# Patient Record
Sex: FEMALE | Race: WHITE | URBAN - METROPOLITAN AREA
[De-identification: names, ages, dates, MRNs, and addresses within clinical notes are randomized per-mention and may not be internally consistent; named-entity substitution may affect disease eponyms.]

---

## 2020-06-10 ENCOUNTER — IMPORTED ENCOUNTER (OUTPATIENT)
Dept: URBAN - METROPOLITAN AREA CLINIC 50 | Facility: CLINIC | Age: 70
End: 2020-06-10

## 2020-06-16 ENCOUNTER — HOSPITAL ENCOUNTER (OUTPATIENT)
Dept: URGENT CARE | Facility: CLINIC | Age: 70
Discharge: HOME | End: 2020-06-16
Attending: FAMILY MEDICINE

## 2020-07-07 ENCOUNTER — IMPORTED ENCOUNTER (OUTPATIENT)
Dept: URBAN - METROPOLITAN AREA CLINIC 50 | Facility: CLINIC | Age: 70
End: 2020-07-07

## 2020-07-22 ENCOUNTER — IMPORTED ENCOUNTER (OUTPATIENT)
Dept: URBAN - METROPOLITAN AREA CLINIC 50 | Facility: CLINIC | Age: 70
End: 2020-07-22

## 2020-07-23 ENCOUNTER — IMPORTED ENCOUNTER (OUTPATIENT)
Dept: URBAN - METROPOLITAN AREA CLINIC 50 | Facility: CLINIC | Age: 70
End: 2020-07-23

## 2020-09-02 ENCOUNTER — IMPORTED ENCOUNTER (OUTPATIENT)
Dept: URBAN - METROPOLITAN AREA CLINIC 50 | Facility: CLINIC | Age: 70
End: 2020-09-02

## 2021-03-02 ENCOUNTER — IMPORTED ENCOUNTER (OUTPATIENT)
Dept: URBAN - METROPOLITAN AREA CLINIC 50 | Facility: CLINIC | Age: 71
End: 2021-03-02

## 2021-04-17 ASSESSMENT — TONOMETRY
OS_IOP_MMHG: 11
OS_IOP_MMHG: 11
OS_IOP_MMHG: 15
OS_IOP_MMHG: 15
OD_IOP_MMHG: 14
OD_IOP_MMHG: 15
OD_IOP_MMHG: 13
OS_IOP_MMHG: 15

## 2021-04-17 ASSESSMENT — PACHYMETRY
OS_CT_UM: 457

## 2021-04-17 ASSESSMENT — VISUAL ACUITY
OD_CC: J1+@ 16 IN
OS_CC: J1
OS_PH: 20/25
OD_CC: CF @ 3'
OS_CC: 20/30
OS_CC: 20/20
OS_BAT: 20/30
OS_CC: J1+@ 16 IN
OD_CC: J1
OS_BAT: 20/25
OD_CC: J1+
OS_CC: 20/25
OS_CC: J1+
OS_OTHER: 20/30. 20/40.
OS_OTHER: 20/25. 20/30.

## 2021-06-07 ENCOUNTER — PREPPED CHART (OUTPATIENT)
Dept: URBAN - METROPOLITAN AREA CLINIC 53 | Facility: CLINIC | Age: 71
End: 2021-06-07

## 2021-06-08 ENCOUNTER — COMPREHENSIVE EXAM (OUTPATIENT)
Dept: URBAN - METROPOLITAN AREA CLINIC 53 | Facility: CLINIC | Age: 71
End: 2021-06-08

## 2021-06-08 DIAGNOSIS — H35.373: ICD-10-CM

## 2021-06-08 DIAGNOSIS — H35.53: ICD-10-CM

## 2021-06-08 DIAGNOSIS — H25.813: ICD-10-CM

## 2021-06-08 DIAGNOSIS — H04.123: ICD-10-CM

## 2021-06-08 DIAGNOSIS — H40.1121: ICD-10-CM

## 2021-06-08 PROCEDURE — 92015 DETERMINE REFRACTIVE STATE: CPT

## 2021-06-08 PROCEDURE — 92134 CPTRZ OPH DX IMG PST SGM RTA: CPT

## 2021-06-08 PROCEDURE — 92014 COMPRE OPH EXAM EST PT 1/>: CPT

## 2021-06-08 ASSESSMENT — VISUAL ACUITY
OD_CC: CF 2FT
OS_CC: 20/70
OS_SC: 20/40-1
OU_CC: J1+
OS_PH: 20/40

## 2021-06-08 ASSESSMENT — TONOMETRY
OS_IOP_MMHG: 10
OD_IOP_MMHG: 9
OS_IOP_MMHG: 14

## 2021-09-07 ENCOUNTER — 6 MONTH COMPREHENSIVE EXAM (OUTPATIENT)
Dept: URBAN - METROPOLITAN AREA CLINIC 53 | Facility: CLINIC | Age: 71
End: 2021-09-07

## 2021-09-07 DIAGNOSIS — H40.1121: ICD-10-CM

## 2021-09-07 DIAGNOSIS — H35.53: ICD-10-CM

## 2021-09-07 DIAGNOSIS — H31.011: ICD-10-CM

## 2021-09-07 DIAGNOSIS — H25.813: ICD-10-CM

## 2021-09-07 PROCEDURE — 92083 EXTENDED VISUAL FIELD XM: CPT

## 2021-09-07 PROCEDURE — 92133 CPTRZD OPH DX IMG PST SGM ON: CPT

## 2021-09-07 PROCEDURE — 92014 COMPRE OPH EXAM EST PT 1/>: CPT

## 2021-09-07 ASSESSMENT — TONOMETRY
OD_IOP_MMHG: 09
OS_IOP_MMHG: 13
OD_IOP_MMHG: 09
OS_IOP_MMHG: 09

## 2021-09-07 ASSESSMENT — VISUAL ACUITY
OS_GLARE: 20/60
OS_GLARE: 20/40
OS_CC: 20/25-2
OD_CC: CF 4FT
OU_CC: J1+

## 2021-11-11 ENCOUNTER — APPOINTMENT (RX ONLY)
Dept: URBAN - METROPOLITAN AREA CLINIC 58 | Facility: CLINIC | Age: 71
Setting detail: DERMATOLOGY
End: 2021-11-11

## 2021-11-11 DIAGNOSIS — D18.0 HEMANGIOMA: ICD-10-CM | Status: STABLE

## 2021-11-11 DIAGNOSIS — L82.1 OTHER SEBORRHEIC KERATOSIS: ICD-10-CM | Status: STABLE

## 2021-11-11 DIAGNOSIS — I78.1 NEVUS, NON-NEOPLASTIC: ICD-10-CM | Status: STABLE

## 2021-11-11 DIAGNOSIS — Z85.828 PERSONAL HISTORY OF OTHER MALIGNANT NEOPLASM OF SKIN: ICD-10-CM

## 2021-11-11 DIAGNOSIS — L72.11 PILAR CYST: ICD-10-CM | Status: STABLE

## 2021-11-11 DIAGNOSIS — D22 MELANOCYTIC NEVI: ICD-10-CM | Status: STABLE

## 2021-11-11 DIAGNOSIS — L81.4 OTHER MELANIN HYPERPIGMENTATION: ICD-10-CM | Status: STABLE

## 2021-11-11 PROBLEM — D22.5 MELANOCYTIC NEVI OF TRUNK: Status: ACTIVE | Noted: 2021-11-11

## 2021-11-11 PROBLEM — D18.01 HEMANGIOMA OF SKIN AND SUBCUTANEOUS TISSUE: Status: ACTIVE | Noted: 2021-11-11

## 2021-11-11 PROCEDURE — ? ADDITIONAL NOTES

## 2021-11-11 PROCEDURE — ? TREATMENT REGIMEN

## 2021-11-11 PROCEDURE — ? COUNSELING

## 2021-11-11 PROCEDURE — 99203 OFFICE O/P NEW LOW 30 MIN: CPT

## 2021-11-11 ASSESSMENT — LOCATION DETAILED DESCRIPTION DERM
LOCATION DETAILED: EPIGASTRIC SKIN
LOCATION DETAILED: PERIUMBILICAL SKIN
LOCATION DETAILED: LEFT CHIN
LOCATION DETAILED: LEFT SUPERIOR PARIETAL SCALP
LOCATION DETAILED: LEFT SUPERIOR MEDIAL LOWER BACK

## 2021-11-11 ASSESSMENT — LOCATION ZONE DERM
LOCATION ZONE: TRUNK
LOCATION ZONE: FACE
LOCATION ZONE: SCALP

## 2021-11-11 ASSESSMENT — LOCATION SIMPLE DESCRIPTION DERM
LOCATION SIMPLE: CHIN
LOCATION SIMPLE: SCALP
LOCATION SIMPLE: ABDOMEN
LOCATION SIMPLE: LEFT LOWER BACK

## 2022-03-22 ENCOUNTER — FOLLOW UP (OUTPATIENT)
Dept: URBAN - METROPOLITAN AREA CLINIC 53 | Facility: CLINIC | Age: 72
End: 2022-03-22

## 2022-03-22 DIAGNOSIS — H40.1121: ICD-10-CM

## 2022-03-22 PROCEDURE — 92012 INTRM OPH EXAM EST PATIENT: CPT

## 2022-03-22 ASSESSMENT — TONOMETRY
OS_IOP_MMHG: 14
OD_IOP_MMHG: 10
OD_IOP_MMHG: 10
OS_IOP_MMHG: 10

## 2022-03-22 ASSESSMENT — VISUAL ACUITY
OD_CC: CF 2FT
OS_CC: 20/25-1

## 2022-06-05 ENCOUNTER — APPOINTMENT (OUTPATIENT)
Dept: GENERAL RADIOLOGY | Facility: HOSPITAL | Age: 72
End: 2022-06-05

## 2022-06-05 ENCOUNTER — HOSPITAL ENCOUNTER (EMERGENCY)
Facility: HOSPITAL | Age: 72
Discharge: HOME | End: 2022-06-05
Attending: EMERGENCY MEDICINE | Admitting: EMERGENCY MEDICINE

## 2022-06-05 VITALS
BODY MASS INDEX: 26.12 KG/M2 | SYSTOLIC BLOOD PRESSURE: 135 MMHG | DIASTOLIC BLOOD PRESSURE: 70 MMHG | HEIGHT: 65 IN | RESPIRATION RATE: 20 BRPM | WEIGHT: 156.75 LBS | OXYGEN SATURATION: 95 % | TEMPERATURE: 98.4 F | HEART RATE: 99 BPM

## 2022-06-05 DIAGNOSIS — J34.89 RHINORRHEA: ICD-10-CM

## 2022-06-05 DIAGNOSIS — Z20.822 EXPOSURE TO COVID-19 VIRUS: Primary | ICD-10-CM

## 2022-06-05 DIAGNOSIS — R05.9 COUGH: ICD-10-CM

## 2022-06-05 DIAGNOSIS — U07.1 POSITIVE SELF-ADMINISTERED ANTIGEN TEST FOR COVID-19: ICD-10-CM

## 2022-06-05 DIAGNOSIS — J98.4 CHRONIC LUNG DISEASE: ICD-10-CM

## 2022-06-05 DIAGNOSIS — Z87.09 HISTORY OF ASTHMA: ICD-10-CM

## 2022-06-05 LAB
FLUAV AG NPH QL: NEGATIVE
FLUBV AG NPH QL IA: NEGATIVE
SARS-COV-2 RNA PNL SPEC NAA+PROBE: DETECTED

## 2022-06-05 PROCEDURE — C9803 HOPD COVID-19 SPEC COLLECT: HCPCS

## 2022-06-05 PROCEDURE — 87804 INFLUENZA ASSAY W/OPTIC: CPT

## 2022-06-05 PROCEDURE — 71045 X-RAY EXAM CHEST 1 VIEW: CPT

## 2022-06-05 PROCEDURE — U0004 COV-19 TEST NON-CDC HGH THRU: HCPCS

## 2022-06-05 PROCEDURE — 99283 EMERGENCY DEPT VISIT LOW MDM: CPT

## 2022-06-05 RX ORDER — BROMPHENIRAMINE MALEATE, PSEUDOEPHEDRINE HYDROCHLORIDE, AND DEXTROMETHORPHAN HYDROBROMIDE 2; 30; 10 MG/5ML; MG/5ML; MG/5ML
10 SYRUP ORAL ONCE
Status: COMPLETED | OUTPATIENT
Start: 2022-06-05 | End: 2022-06-05

## 2022-06-05 RX ORDER — GUAIFENESIN 600 MG/1
1200 TABLET, EXTENDED RELEASE ORAL ONCE
Status: COMPLETED | OUTPATIENT
Start: 2022-06-05 | End: 2022-06-05

## 2022-06-05 RX ADMIN — BROMPHENIRAMINE MALEATE, PSEUDOEPHEDRINE HYDROCHLORIDE, AND DEXTROMETHORPHAN HYDROBROMIDE 10 ML: 2; 30; 10 SYRUP ORAL at 13:10

## 2022-06-05 RX ADMIN — GUAIFENESIN 1200 MG: 600 TABLET ORAL at 13:10

## 2022-06-05 NOTE — ED PROVIDER NOTES
Subjective   Patient is a 71-year-old female presenting today due to neck pain, rhinorrhea and cough that started today.  Patient was prescribed a round of steroids from acute care on Monday and finished them.  Patient has taken 2 COVID tests at home which were both negative earlier in the week she took 1 this morning which was positive.  Patient has a history of asthma and uses albuterol and Symbicort.  She has not taken any analgesics this morning for her neck pain.  She denies a history of diabetes, hypertension or CKD. Her daughter also had covid last week.       History provided by:  Patient   used: No    URI  Presenting symptoms: cough and rhinorrhea    Presenting symptoms: no fever    Severity:  Mild  Associated symptoms: headaches and neck pain    Associated symptoms: no wheezing    Risk factors: being elderly, recent travel and sick contacts    Risk factors: no chronic kidney disease and no diabetes mellitus        Review of Systems   Constitutional: Negative.  Negative for fever.   HENT: Positive for rhinorrhea.    Eyes: Negative.    Respiratory: Positive for cough. Negative for wheezing.    Cardiovascular: Negative.    Gastrointestinal: Negative.    Endocrine: Negative.    Genitourinary: Negative.    Musculoskeletal: Positive for neck pain.   Skin: Negative.    Allergic/Immunologic: Negative.    Neurological: Positive for headaches.   Hematological: Negative.    Psychiatric/Behavioral: Negative.        Past Medical History:   Diagnosis Date   • Glaucoma    • Hyperlipidemia        No Known Allergies    Past Surgical History:   Procedure Laterality Date   • HERNIA REPAIR     • HYSTERECTOMY         History reviewed. No pertinent family history.    Social History     Socioeconomic History   • Marital status:    Tobacco Use   • Smoking status: Never Smoker   • Smokeless tobacco: Former User   Vaping Use   • Vaping Use: Never used   Substance and Sexual Activity   • Alcohol use: Not  Currently     Comment: OCC   • Drug use: Defer   • Sexual activity: Defer           Objective   Physical Exam  Vitals and nursing note reviewed.   Constitutional:       Appearance: Normal appearance.   HENT:      Head: Normocephalic and atraumatic.      Nose: Rhinorrhea present.      Mouth/Throat:      Mouth: Mucous membranes are moist.   Eyes:      Extraocular Movements: Extraocular movements intact.      Conjunctiva/sclera: Conjunctivae normal.      Pupils: Pupils are equal, round, and reactive to light.   Cardiovascular:      Rate and Rhythm: Normal rate and regular rhythm.      Heart sounds: Normal heart sounds.   Pulmonary:      Effort: Pulmonary effort is normal.      Breath sounds: Normal breath sounds. No wheezing.   Musculoskeletal:         General: Normal range of motion.      Cervical back: Normal range of motion and neck supple.   Skin:     General: Skin is warm and dry.   Neurological:      General: No focal deficit present.      Mental Status: She is alert and oriented to person, place, and time.   Psychiatric:         Mood and Affect: Mood normal.         Behavior: Behavior normal.         Procedures           ED Course  ED Course as of 06/05/22 1414   Sun Jun 05, 2022   1246 Patient would like the monoclonal antibody tx.  [AJ]      ED Course User Index  [AJ] Allan Rhodes PA-C                                                 [facility]    Final diagnoses:   Exposure to COVID-19 virus   Positive self-administered antigen test for COVID-19   Rhinorrhea   Cough   History of asthma   Chronic lung disease       ED Disposition  ED Disposition     ED Disposition   Discharge    Condition   Stable    Comment   --             Provider, No Known  [facility] [place] [id number]      If symptoms worsen         Medication List      No changes were made to your prescriptions during this visit.          Allan Rhodes PA-C  06/05/22 1414

## 2022-06-05 NOTE — DISCHARGE INSTRUCTIONS
You can take tylenol/motrin as needed   Can take over-the-counter cold and cough medicine as needed  Pending your COVID results which you will receive on [employer] call number given on sheet in the morning for monoclonal antibody treatment    Follow-up with your primary care as needed

## 2022-06-06 ENCOUNTER — TRANSCRIBE ORDERS (OUTPATIENT)
Dept: ADMINISTRATIVE | Facility: HOSPITAL | Age: 72
End: 2022-06-06

## 2022-06-06 ENCOUNTER — HOSPITAL ENCOUNTER (OUTPATIENT)
Dept: INFUSION THERAPY | Facility: HOSPITAL | Age: 72
Setting detail: INFUSION SERIES
Discharge: HOME | End: 2022-06-06

## 2022-06-06 VITALS
DIASTOLIC BLOOD PRESSURE: 64 MMHG | HEART RATE: 88 BPM | TEMPERATURE: 97.6 F | RESPIRATION RATE: 16 BRPM | SYSTOLIC BLOOD PRESSURE: 158 MMHG

## 2022-06-06 DIAGNOSIS — U07.1 COVID-19: Primary | ICD-10-CM

## 2022-06-06 DIAGNOSIS — U07.1 COVID-19: ICD-10-CM

## 2022-06-06 PROCEDURE — 25010000002 INJECTION, BEBTELOVIMAB, 175 MG

## 2022-06-06 PROCEDURE — 96374 THER/PROPH/DIAG INJ IV PUSH: CPT

## 2022-06-06 PROCEDURE — M0222 HC INJECTION BEBTELOVIMAB: HCPCS

## 2022-06-06 RX ORDER — DIPHENHYDRAMINE HYDROCHLORIDE 50 MG/ML
50 INJECTION INTRAMUSCULAR; INTRAVENOUS ONCE AS NEEDED
Status: DISCONTINUED | OUTPATIENT
Start: 2022-06-06 | End: 2022-06-08 | Stop reason: HOSPADM

## 2022-06-06 RX ORDER — SODIUM CHLORIDE 9 MG/ML
30 INJECTION, SOLUTION INTRAVENOUS ONCE
Status: DISCONTINUED | OUTPATIENT
Start: 2022-06-06 | End: 2022-06-08 | Stop reason: HOSPADM

## 2022-06-06 RX ORDER — METHYLPREDNISOLONE SODIUM SUCCINATE 125 MG/2ML
125 INJECTION, POWDER, LYOPHILIZED, FOR SOLUTION INTRAMUSCULAR; INTRAVENOUS ONCE AS NEEDED
Status: DISCONTINUED | OUTPATIENT
Start: 2022-06-06 | End: 2022-06-08 | Stop reason: HOSPADM

## 2022-06-06 RX ORDER — BEBTELOVIMAB 87.5 MG/ML
175 INJECTION, SOLUTION INTRAVENOUS ONCE
Status: COMPLETED | OUTPATIENT
Start: 2022-06-06 | End: 2022-06-06

## 2022-06-06 RX ORDER — DIPHENHYDRAMINE HCL 50 MG
50 CAPSULE ORAL ONCE AS NEEDED
Status: DISCONTINUED | OUTPATIENT
Start: 2022-06-06 | End: 2022-06-08 | Stop reason: HOSPADM

## 2022-06-06 RX ADMIN — BEBTELOVIMAB 175 MG: 87.5 INJECTION, SOLUTION INTRAVENOUS at 17:28

## 2022-09-13 ENCOUNTER — COMPREHENSIVE EXAM (OUTPATIENT)
Dept: URBAN - METROPOLITAN AREA CLINIC 53 | Facility: CLINIC | Age: 72
End: 2022-09-13

## 2022-09-13 DIAGNOSIS — H40.011: ICD-10-CM

## 2022-09-13 DIAGNOSIS — H31.013: ICD-10-CM

## 2022-09-13 DIAGNOSIS — H40.1121: ICD-10-CM

## 2022-09-13 DIAGNOSIS — H25.813: ICD-10-CM

## 2022-09-13 PROCEDURE — 92014 COMPRE OPH EXAM EST PT 1/>: CPT

## 2022-09-13 PROCEDURE — 92083 EXTENDED VISUAL FIELD XM: CPT

## 2022-09-13 PROCEDURE — 92133 CPTRZD OPH DX IMG PST SGM ON: CPT

## 2022-09-13 ASSESSMENT — TONOMETRY
OD_IOP_MMHG: 10
OS_IOP_MMHG: 15
OS_IOP_MMHG: 11

## 2022-09-13 ASSESSMENT — VISUAL ACUITY
OD_SC: CF 2FT
OS_PH: 20/25
OS_GLARE: 20/50
OS_CC: J1+@14INCHES
OS_SC: 20/30-2

## 2022-09-27 ENCOUNTER — CONTACT LENSES/GLASSES VISIT (OUTPATIENT)
Dept: URBAN - METROPOLITAN AREA CLINIC 53 | Facility: CLINIC | Age: 72
End: 2022-09-27

## 2022-09-27 DIAGNOSIS — H53.8: ICD-10-CM

## 2022-09-27 PROCEDURE — 92015GRNC REFRACTION GLASSES RECHECK NO CHARGE

## 2022-09-27 ASSESSMENT — VISUAL ACUITY
OS_CC: 20/50
OS_PH: 20/30
OD_CC: CF 3FT

## 2022-10-25 ENCOUNTER — APPOINTMENT (RX ONLY)
Dept: URBAN - METROPOLITAN AREA CLINIC 58 | Facility: CLINIC | Age: 72
Setting detail: DERMATOLOGY
End: 2022-10-25

## 2022-10-25 DIAGNOSIS — L81.4 OTHER MELANIN HYPERPIGMENTATION: ICD-10-CM | Status: STABLE

## 2022-10-25 DIAGNOSIS — D18.0 HEMANGIOMA: ICD-10-CM | Status: STABLE

## 2022-10-25 DIAGNOSIS — Z85.828 PERSONAL HISTORY OF OTHER MALIGNANT NEOPLASM OF SKIN: ICD-10-CM

## 2022-10-25 DIAGNOSIS — D22 MELANOCYTIC NEVI: ICD-10-CM | Status: STABLE

## 2022-10-25 DIAGNOSIS — L82.1 OTHER SEBORRHEIC KERATOSIS: ICD-10-CM | Status: STABLE

## 2022-10-25 PROBLEM — D22.5 MELANOCYTIC NEVI OF TRUNK: Status: ACTIVE | Noted: 2022-10-25

## 2022-10-25 PROBLEM — D18.01 HEMANGIOMA OF SKIN AND SUBCUTANEOUS TISSUE: Status: ACTIVE | Noted: 2022-10-25

## 2022-10-25 PROCEDURE — 99213 OFFICE O/P EST LOW 20 MIN: CPT

## 2022-10-25 PROCEDURE — ? FULL BODY SKIN EXAM

## 2022-10-25 PROCEDURE — ? COUNSELING

## 2022-10-25 PROCEDURE — ? TREATMENT REGIMEN

## 2022-10-25 ASSESSMENT — LOCATION SIMPLE DESCRIPTION DERM
LOCATION SIMPLE: ABDOMEN
LOCATION SIMPLE: LEFT LOWER BACK

## 2022-10-25 ASSESSMENT — LOCATION DETAILED DESCRIPTION DERM
LOCATION DETAILED: LEFT SUPERIOR MEDIAL LOWER BACK
LOCATION DETAILED: PERIUMBILICAL SKIN
LOCATION DETAILED: EPIGASTRIC SKIN

## 2022-10-25 ASSESSMENT — LOCATION ZONE DERM: LOCATION ZONE: TRUNK

## 2023-06-06 ENCOUNTER — DIAGNOSTICS ONLY (OUTPATIENT)
Dept: URBAN - METROPOLITAN AREA CLINIC 53 | Facility: CLINIC | Age: 73
End: 2023-06-06

## 2023-06-06 DIAGNOSIS — H40.011: ICD-10-CM

## 2023-06-06 DIAGNOSIS — H40.1121: ICD-10-CM

## 2023-06-06 PROCEDURE — 92133 CPTRZD OPH DX IMG PST SGM ON: CPT

## 2023-06-06 PROCEDURE — 92083 EXTENDED VISUAL FIELD XM: CPT

## 2023-08-29 ENCOUNTER — COMPREHENSIVE EXAM (OUTPATIENT)
Dept: URBAN - METROPOLITAN AREA CLINIC 53 | Facility: CLINIC | Age: 73
End: 2023-08-29

## 2023-08-29 DIAGNOSIS — H31.013: ICD-10-CM

## 2023-08-29 DIAGNOSIS — H35.373: ICD-10-CM

## 2023-08-29 DIAGNOSIS — H35.53: ICD-10-CM

## 2023-08-29 DIAGNOSIS — H25.813: ICD-10-CM

## 2023-08-29 DIAGNOSIS — H33.001: ICD-10-CM

## 2023-08-29 DIAGNOSIS — H40.1112: ICD-10-CM

## 2023-08-29 DIAGNOSIS — H40.1121: ICD-10-CM

## 2023-08-29 PROCEDURE — 99214 OFFICE O/P EST MOD 30 MIN: CPT

## 2023-08-29 PROCEDURE — 92134 CPTRZ OPH DX IMG PST SGM RTA: CPT

## 2023-08-29 ASSESSMENT — VISUAL ACUITY
OS_PH: 20/40-2
OD_GLARE: 20/30
OU_CC: J1 PUSH
OS_GLARE: 20/60
OD_CC: CF 2FT
OS_CC: 20/60

## 2023-08-29 ASSESSMENT — TONOMETRY
OS_IOP_MMHG: 10
OS_IOP_MMHG: 14
OD_IOP_MMHG: 8
OD_IOP_MMHG: 8

## 2023-10-04 ENCOUNTER — PRE-OP/H&P (OUTPATIENT)
Dept: URBAN - METROPOLITAN AREA CLINIC 53 | Facility: CLINIC | Age: 73
End: 2023-10-04

## 2023-10-04 DIAGNOSIS — H04.123: ICD-10-CM

## 2023-10-04 DIAGNOSIS — H43.811: ICD-10-CM

## 2023-10-04 DIAGNOSIS — H35.372: ICD-10-CM

## 2023-10-04 DIAGNOSIS — H18.593: ICD-10-CM

## 2023-10-04 DIAGNOSIS — H35.53: ICD-10-CM

## 2023-10-04 DIAGNOSIS — H25.813: ICD-10-CM

## 2023-10-04 DIAGNOSIS — H40.1122: ICD-10-CM

## 2023-10-04 DIAGNOSIS — H40.1111: ICD-10-CM

## 2023-10-04 DIAGNOSIS — H31.013: ICD-10-CM

## 2023-10-04 PROCEDURE — PREOP PRE OP VISIT

## 2023-10-04 PROCEDURE — 92025-3 CORNEAL TOPO, REFUSED

## 2023-10-04 PROCEDURE — 92025CV CORNEAL TOPOGRAPHY, CV

## 2023-10-04 PROCEDURE — 92136 OPHTHALMIC BIOMETRY: CPT

## 2023-10-04 ASSESSMENT — VISUAL ACUITY
OS_CC: 20/60-1
OU_CC: 20/60-1
OD_CC: CF 3FT
OS_PH: 20/40

## 2023-10-04 ASSESSMENT — TONOMETRY
OS_IOP_MMHG: 15
OD_IOP_MMHG: 10
OD_IOP_MMHG: 10
OS_IOP_MMHG: 11

## 2023-10-04 ASSESSMENT — KERATOMETRY
OD_K1POWER_DIOPTERS: 42.25
OD_AXISANGLE_DEGREES: 092
OD_K2POWER_DIOPTERS: 41.62
OS_K1POWER_DIOPTERS: 42.50
OS_AXISANGLE_DEGREES: 15
OS_K2POWER_DIOPTERS: 41.25
OS_AXISANGLE2_DEGREES: 105
OD_AXISANGLE2_DEGREES: 2

## 2023-10-12 ENCOUNTER — POST-OP (OUTPATIENT)
Dept: URBAN - METROPOLITAN AREA CLINIC 48 | Facility: LOCATION | Age: 73
End: 2023-10-12

## 2023-10-12 ENCOUNTER — SURGERY/PROCEDURE (OUTPATIENT)
Dept: URBAN - METROPOLITAN AREA SURGERY 16 | Facility: SURGERY | Age: 73
End: 2023-10-12

## 2023-10-12 DIAGNOSIS — Z96.1: ICD-10-CM

## 2023-10-12 DIAGNOSIS — H40.1111: ICD-10-CM

## 2023-10-12 DIAGNOSIS — H25.811: ICD-10-CM

## 2023-10-12 DIAGNOSIS — Z98.41: ICD-10-CM

## 2023-10-12 PROCEDURE — 66174 TRLUML DIL AQ O/F CAN W/O ST: CPT

## 2023-10-12 PROCEDURE — 66984 XCAPSL CTRC RMVL W/O ECP: CPT

## 2023-10-12 ASSESSMENT — KERATOMETRY
OD_AXISANGLE_DEGREES: 092
OS_K2POWER_DIOPTERS: 41.25
OS_K1POWER_DIOPTERS: 42.50
OD_K1POWER_DIOPTERS: 42.25
OS_AXISANGLE_DEGREES: 15
OD_K2POWER_DIOPTERS: 41.62
OD_AXISANGLE2_DEGREES: 2
OS_AXISANGLE2_DEGREES: 105

## 2023-10-12 ASSESSMENT — TONOMETRY: OD_IOP_MMHG: 08

## 2023-10-18 ENCOUNTER — POST OP/EVAL OF SECOND EYE (OUTPATIENT)
Dept: URBAN - METROPOLITAN AREA CLINIC 53 | Facility: CLINIC | Age: 73
End: 2023-10-18

## 2023-10-18 DIAGNOSIS — Z98.41: ICD-10-CM

## 2023-10-18 DIAGNOSIS — H40.1111: ICD-10-CM

## 2023-10-18 DIAGNOSIS — H25.812: ICD-10-CM

## 2023-10-18 DIAGNOSIS — H35.372: ICD-10-CM

## 2023-10-18 DIAGNOSIS — Z96.1: ICD-10-CM

## 2023-10-18 DIAGNOSIS — H40.1122: ICD-10-CM

## 2023-10-18 PROCEDURE — 92134 CPTRZ OPH DX IMG PST SGM RTA: CPT

## 2023-10-18 PROCEDURE — 99213 OFFICE O/P EST LOW 20 MIN: CPT | Mod: 24

## 2023-10-18 PROCEDURE — 92136NC IOL MASTER NO CHARGE: Mod: NC,LT

## 2023-10-18 ASSESSMENT — TONOMETRY
OD_IOP_MMHG: 11
OD_IOP_MMHG: 11
OS_IOP_MMHG: 16
OS_IOP_MMHG: 12

## 2023-10-18 ASSESSMENT — VISUAL ACUITY
OS_GLARE: 20/30
OS_PH: 20/30-1
OD_SC: 20/350-1
OS_GLARE: 20/30-1
OS_CC: 20/50-1

## 2023-10-26 ENCOUNTER — POST-OP (OUTPATIENT)
Dept: URBAN - METROPOLITAN AREA CLINIC 48 | Facility: LOCATION | Age: 73
End: 2023-10-26

## 2023-10-26 ENCOUNTER — SURGERY/PROCEDURE (OUTPATIENT)
Dept: URBAN - METROPOLITAN AREA SURGERY 16 | Facility: SURGERY | Age: 73
End: 2023-10-26

## 2023-10-26 DIAGNOSIS — H25.812: ICD-10-CM

## 2023-10-26 DIAGNOSIS — Z98.42: ICD-10-CM

## 2023-10-26 DIAGNOSIS — H40.1122: ICD-10-CM

## 2023-10-26 DIAGNOSIS — Z96.1: ICD-10-CM

## 2023-10-26 PROCEDURE — 99199PCV CUSTOM VISION

## 2023-10-26 PROCEDURE — 66174 TRLUML DIL AQ O/F CAN W/O ST: CPT | Mod: 79,LT

## 2023-10-26 PROCEDURE — 66991CV REMOVE CATARACT INSERTION ANTERIOR SEG DRAIN DEVICE, CUSTOM: Mod: 79,LT

## 2023-10-26 ASSESSMENT — TONOMETRY: OS_IOP_MMHG: 10

## 2023-10-26 ASSESSMENT — VISUAL ACUITY: OS_SC: 20/100-1

## 2023-11-01 ENCOUNTER — POST-OP (OUTPATIENT)
Dept: URBAN - METROPOLITAN AREA CLINIC 53 | Facility: CLINIC | Age: 73
End: 2023-11-01

## 2023-11-01 DIAGNOSIS — Z96.1: ICD-10-CM

## 2023-11-01 DIAGNOSIS — H40.1122: ICD-10-CM

## 2023-11-01 DIAGNOSIS — Z98.42: ICD-10-CM

## 2023-11-01 PROCEDURE — 99024 POSTOP FOLLOW-UP VISIT: CPT

## 2023-11-01 ASSESSMENT — VISUAL ACUITY
OS_SC: 20/25
OD_SC: 20/250

## 2023-11-01 ASSESSMENT — TONOMETRY
OD_IOP_MMHG: 10
OD_IOP_MMHG: 10
OS_IOP_MMHG: 17
OS_IOP_MMHG: 13

## 2023-11-22 ENCOUNTER — POST-OP (OUTPATIENT)
Dept: URBAN - METROPOLITAN AREA CLINIC 53 | Facility: CLINIC | Age: 73
End: 2023-11-22

## 2023-11-22 DIAGNOSIS — Z98.42: ICD-10-CM

## 2023-11-22 DIAGNOSIS — H35.372: ICD-10-CM

## 2023-11-22 DIAGNOSIS — H31.013: ICD-10-CM

## 2023-11-22 DIAGNOSIS — H04.123: ICD-10-CM

## 2023-11-22 DIAGNOSIS — H18.593: ICD-10-CM

## 2023-11-22 DIAGNOSIS — H43.811: ICD-10-CM

## 2023-11-22 DIAGNOSIS — H40.1122: ICD-10-CM

## 2023-11-22 DIAGNOSIS — H40.1111: ICD-10-CM

## 2023-11-22 DIAGNOSIS — Z98.41: ICD-10-CM

## 2023-11-22 PROCEDURE — 99024 POSTOP FOLLOW-UP VISIT: CPT

## 2023-11-22 PROCEDURE — 92015 DETERMINE REFRACTIVE STATE: CPT

## 2023-11-22 ASSESSMENT — KERATOMETRY
OS_K1POWER_DIOPTERS: 41.75
OS_AXISANGLE_DEGREES: 105
OD_K1POWER_DIOPTERS: 42.25
OS_AXISANGLE2_DEGREES: 15
OD_AXISANGLE2_DEGREES: 101
OS_K2POWER_DIOPTERS: 43.00
OD_K2POWER_DIOPTERS: 43.25
OD_AXISANGLE_DEGREES: 011

## 2023-11-22 ASSESSMENT — VISUAL ACUITY
OU_SC: J2@16INCHES
OD_SC: 20/250
OS_SC: 20/25

## 2023-11-22 ASSESSMENT — TONOMETRY
OS_IOP_MMHG: 17
OS_IOP_MMHG: 13
OD_IOP_MMHG: 10

## 2023-11-28 ENCOUNTER — APPOINTMENT (RX ONLY)
Dept: URBAN - METROPOLITAN AREA CLINIC 58 | Facility: CLINIC | Age: 73
Setting detail: DERMATOLOGY
End: 2023-11-28

## 2023-11-28 DIAGNOSIS — L72.11 PILAR CYST: ICD-10-CM | Status: STABLE

## 2023-11-28 DIAGNOSIS — Z85.828 PERSONAL HISTORY OF OTHER MALIGNANT NEOPLASM OF SKIN: ICD-10-CM

## 2023-11-28 DIAGNOSIS — D22 MELANOCYTIC NEVI: ICD-10-CM | Status: STABLE

## 2023-11-28 DIAGNOSIS — D18.0 HEMANGIOMA: ICD-10-CM | Status: STABLE

## 2023-11-28 DIAGNOSIS — L81.4 OTHER MELANIN HYPERPIGMENTATION: ICD-10-CM | Status: STABLE

## 2023-11-28 DIAGNOSIS — L82.1 OTHER SEBORRHEIC KERATOSIS: ICD-10-CM | Status: STABLE

## 2023-11-28 PROBLEM — D18.01 HEMANGIOMA OF SKIN AND SUBCUTANEOUS TISSUE: Status: ACTIVE | Noted: 2023-11-28

## 2023-11-28 PROBLEM — D22.5 MELANOCYTIC NEVI OF TRUNK: Status: ACTIVE | Noted: 2023-11-28

## 2023-11-28 PROCEDURE — ? TREATMENT REGIMEN

## 2023-11-28 PROCEDURE — ? COUNSELING

## 2023-11-28 PROCEDURE — 99213 OFFICE O/P EST LOW 20 MIN: CPT

## 2023-11-28 PROCEDURE — ? FULL BODY SKIN EXAM

## 2023-11-28 ASSESSMENT — LOCATION DETAILED DESCRIPTION DERM
LOCATION DETAILED: PERIUMBILICAL SKIN
LOCATION DETAILED: LEFT SUPERIOR PARIETAL SCALP
LOCATION DETAILED: LEFT SUPERIOR MEDIAL LOWER BACK
LOCATION DETAILED: EPIGASTRIC SKIN

## 2023-11-28 ASSESSMENT — LOCATION ZONE DERM
LOCATION ZONE: TRUNK
LOCATION ZONE: SCALP

## 2023-11-28 ASSESSMENT — LOCATION SIMPLE DESCRIPTION DERM
LOCATION SIMPLE: SCALP
LOCATION SIMPLE: LEFT LOWER BACK
LOCATION SIMPLE: ABDOMEN

## 2023-11-28 NOTE — PROCEDURE: FULL BODY SKIN EXAM
Instructions: This plan will send the code FBSE to the PM system.  DO NOT or CHANGE the price.
Price (Do Not Change): 0.00
Detail Level: Simple
yes

## 2024-01-30 ENCOUNTER — FOLLOW UP (OUTPATIENT)
Dept: URBAN - METROPOLITAN AREA CLINIC 53 | Facility: CLINIC | Age: 74
End: 2024-01-30

## 2024-01-30 DIAGNOSIS — H40.1111: ICD-10-CM

## 2024-01-30 DIAGNOSIS — H04.123: ICD-10-CM

## 2024-01-30 DIAGNOSIS — H02.051: ICD-10-CM

## 2024-01-30 DIAGNOSIS — H40.1122: ICD-10-CM

## 2024-01-30 PROCEDURE — 67820 REVISE EYELASHES: CPT

## 2024-01-30 PROCEDURE — 99213 OFFICE O/P EST LOW 20 MIN: CPT

## 2024-01-30 ASSESSMENT — TONOMETRY
OD_IOP_MMHG: 10
OS_IOP_MMHG: 10
OS_IOP_MMHG: 14
OD_IOP_MMHG: 10

## 2024-01-30 ASSESSMENT — VISUAL ACUITY
OS_CC: 20/25-1
OD_CC: 20/300-1
OU_CC: 20/25

## 2024-07-15 ENCOUNTER — DIAGNOSTICS ONLY (OUTPATIENT)
Dept: URBAN - METROPOLITAN AREA CLINIC 53 | Facility: CLINIC | Age: 74
End: 2024-07-15

## 2024-07-15 DIAGNOSIS — H40.1122: ICD-10-CM

## 2024-07-15 PROCEDURE — 92133 CPTRZD OPH DX IMG PST SGM ON: CPT

## 2024-07-15 PROCEDURE — 92083 EXTENDED VISUAL FIELD XM: CPT

## 2024-07-30 ENCOUNTER — FOLLOW UP (OUTPATIENT)
Dept: URBAN - METROPOLITAN AREA CLINIC 53 | Facility: CLINIC | Age: 74
End: 2024-07-30

## 2024-07-30 DIAGNOSIS — H40.1111: ICD-10-CM

## 2024-07-30 DIAGNOSIS — H40.1122: ICD-10-CM

## 2024-07-30 PROCEDURE — 99213 OFFICE O/P EST LOW 20 MIN: CPT

## 2024-07-30 ASSESSMENT — VISUAL ACUITY
OU_CC: 20/25-1
OD_CC: 20/250
OS_CC: 20/25

## 2024-07-30 ASSESSMENT — TONOMETRY
OD_IOP_MMHG: 9
OD_IOP_MMHG: 9
OS_IOP_MMHG: 13
OS_IOP_MMHG: 9

## 2024-10-21 ENCOUNTER — FOLLOW UP (OUTPATIENT)
Dept: URBAN - METROPOLITAN AREA CLINIC 52 | Facility: CLINIC | Age: 74
End: 2024-10-21

## 2024-10-21 DIAGNOSIS — H02.822: ICD-10-CM

## 2024-10-21 DIAGNOSIS — H02.051: ICD-10-CM

## 2024-10-21 PROCEDURE — 67820 REVISE EYELASHES: CPT

## 2024-10-21 PROCEDURE — 99213 OFFICE O/P EST LOW 20 MIN: CPT | Mod: 25

## 2025-01-21 ENCOUNTER — COMPREHENSIVE EXAM (OUTPATIENT)
Age: 75
End: 2025-01-21

## 2025-01-21 DIAGNOSIS — H40.1111: ICD-10-CM

## 2025-01-21 DIAGNOSIS — H02.051: ICD-10-CM

## 2025-01-21 DIAGNOSIS — H35.372: ICD-10-CM

## 2025-01-21 DIAGNOSIS — H04.123: ICD-10-CM

## 2025-01-21 DIAGNOSIS — H02.822: ICD-10-CM

## 2025-01-21 DIAGNOSIS — H35.53: ICD-10-CM

## 2025-01-21 DIAGNOSIS — H40.1122: ICD-10-CM

## 2025-01-21 PROCEDURE — 92134 CPTRZ OPH DX IMG PST SGM RTA: CPT

## 2025-01-21 PROCEDURE — 99214 OFFICE O/P EST MOD 30 MIN: CPT

## 2025-01-21 PROCEDURE — 92015 DETERMINE REFRACTIVE STATE: CPT

## 2025-02-24 ENCOUNTER — APPOINTMENT (OUTPATIENT)
Age: 75
Setting detail: DERMATOLOGY
End: 2025-02-24

## 2025-02-24 DIAGNOSIS — D69.2 OTHER NONTHROMBOCYTOPENIC PURPURA: ICD-10-CM | Status: STABLE

## 2025-02-24 DIAGNOSIS — D22 MELANOCYTIC NEVI: ICD-10-CM | Status: STABLE

## 2025-02-24 DIAGNOSIS — L82.1 OTHER SEBORRHEIC KERATOSIS: ICD-10-CM | Status: STABLE

## 2025-02-24 DIAGNOSIS — L81.4 OTHER MELANIN HYPERPIGMENTATION: ICD-10-CM | Status: STABLE

## 2025-02-24 DIAGNOSIS — D18.0 HEMANGIOMA: ICD-10-CM | Status: STABLE

## 2025-02-24 DIAGNOSIS — Z85.828 PERSONAL HISTORY OF OTHER MALIGNANT NEOPLASM OF SKIN: ICD-10-CM

## 2025-02-24 PROBLEM — D22.5 MELANOCYTIC NEVI OF TRUNK: Status: ACTIVE | Noted: 2025-02-24

## 2025-02-24 PROBLEM — D18.01 HEMANGIOMA OF SKIN AND SUBCUTANEOUS TISSUE: Status: ACTIVE | Noted: 2025-02-24

## 2025-02-24 PROCEDURE — 99213 OFFICE O/P EST LOW 20 MIN: CPT

## 2025-02-24 PROCEDURE — ? TREATMENT REGIMEN

## 2025-02-24 PROCEDURE — ? COUNSELING

## 2025-02-24 ASSESSMENT — LOCATION DETAILED DESCRIPTION DERM
LOCATION DETAILED: SUPERIOR THORACIC SPINE
LOCATION DETAILED: LEFT PROXIMAL DORSAL FOREARM
LOCATION DETAILED: RIGHT DISTAL DORSAL FOREARM
LOCATION DETAILED: PERIUMBILICAL SKIN
LOCATION DETAILED: EPIGASTRIC SKIN

## 2025-02-24 ASSESSMENT — LOCATION SIMPLE DESCRIPTION DERM
LOCATION SIMPLE: RIGHT FOREARM
LOCATION SIMPLE: UPPER BACK
LOCATION SIMPLE: ABDOMEN
LOCATION SIMPLE: LEFT FOREARM

## 2025-02-24 ASSESSMENT — LOCATION ZONE DERM
LOCATION ZONE: ARM
LOCATION ZONE: TRUNK

## 2025-08-05 ENCOUNTER — FOLLOW UP (OUTPATIENT)
Age: 75
End: 2025-08-05

## 2025-08-05 DIAGNOSIS — H40.1111: ICD-10-CM

## 2025-08-05 DIAGNOSIS — H02.88B: ICD-10-CM

## 2025-08-05 DIAGNOSIS — H40.1122: ICD-10-CM

## 2025-08-05 DIAGNOSIS — H04.123: ICD-10-CM

## 2025-08-05 DIAGNOSIS — H02.88A: ICD-10-CM

## 2025-08-05 PROCEDURE — 92083 EXTENDED VISUAL FIELD XM: CPT

## 2025-08-05 PROCEDURE — 99213 OFFICE O/P EST LOW 20 MIN: CPT

## 2025-08-05 PROCEDURE — 92133 CPTRZD OPH DX IMG PST SGM ON: CPT
